# Patient Record
Sex: MALE | Race: WHITE | NOT HISPANIC OR LATINO | Employment: OTHER | URBAN - METROPOLITAN AREA
[De-identification: names, ages, dates, MRNs, and addresses within clinical notes are randomized per-mention and may not be internally consistent; named-entity substitution may affect disease eponyms.]

---

## 2017-05-07 ENCOUNTER — APPOINTMENT (EMERGENCY)
Dept: RADIOLOGY | Facility: HOSPITAL | Age: 77
End: 2017-05-07
Payer: MEDICARE

## 2017-05-07 ENCOUNTER — HOSPITAL ENCOUNTER (EMERGENCY)
Facility: HOSPITAL | Age: 77
Discharge: HOME | End: 2017-05-07
Attending: EMERGENCY MEDICINE | Admitting: EMERGENCY MEDICINE
Payer: MEDICARE

## 2017-05-07 ENCOUNTER — APPOINTMENT (EMERGENCY)
Dept: CT IMAGING | Facility: HOSPITAL | Age: 77
End: 2017-05-07
Payer: MEDICARE

## 2017-05-07 VITALS
OXYGEN SATURATION: 100 % | SYSTOLIC BLOOD PRESSURE: 196 MMHG | BODY MASS INDEX: 27.92 KG/M2 | DIASTOLIC BLOOD PRESSURE: 89 MMHG | TEMPERATURE: 97.9 F | RESPIRATION RATE: 18 BRPM | WEIGHT: 173 LBS | HEART RATE: 67 BPM

## 2017-05-07 DIAGNOSIS — R07.89 ATYPICAL CHEST PAIN: ICD-10-CM

## 2017-05-07 DIAGNOSIS — I10 HYPERTENSION: ICD-10-CM

## 2017-05-07 DIAGNOSIS — R74.8 ELEVATED LIPASE: ICD-10-CM

## 2017-05-07 DIAGNOSIS — R10.13 EPIGASTRIC PAIN: Primary | ICD-10-CM

## 2017-05-07 LAB
ALBUMIN SERPL BCP-MCNC: 3.7 G/DL (ref 3.5–5)
ALP SERPL-CCNC: 88 U/L (ref 46–116)
ALT SERPL W P-5'-P-CCNC: 33 U/L (ref 12–78)
ANION GAP SERPL CALCULATED.3IONS-SCNC: 9 MMOL/L (ref 4–13)
AST SERPL W P-5'-P-CCNC: 25 U/L (ref 5–45)
ATRIAL RATE: 74 BPM
BASOPHILS # BLD AUTO: 0.02 THOUSANDS/ΜL (ref 0–0.1)
BASOPHILS NFR BLD AUTO: 0 % (ref 0–1)
BILIRUB DIRECT SERPL-MCNC: 0.09 MG/DL (ref 0–0.2)
BILIRUB SERPL-MCNC: 0.35 MG/DL (ref 0.2–1)
BUN SERPL-MCNC: 25 MG/DL (ref 5–25)
CALCIUM SERPL-MCNC: 9.1 MG/DL (ref 8.3–10.1)
CHLORIDE SERPL-SCNC: 103 MMOL/L (ref 100–108)
CO2 SERPL-SCNC: 25 MMOL/L (ref 21–32)
CREAT SERPL-MCNC: 1.65 MG/DL (ref 0.6–1.3)
EOSINOPHIL # BLD AUTO: 0.13 THOUSAND/ΜL (ref 0–0.61)
EOSINOPHIL NFR BLD AUTO: 2 % (ref 0–6)
ERYTHROCYTE [DISTWIDTH] IN BLOOD BY AUTOMATED COUNT: 14.7 % (ref 11.6–15.1)
GFR SERPL CREATININE-BSD FRML MDRD: 40.9 ML/MIN/1.73SQ M
GLUCOSE SERPL-MCNC: 95 MG/DL (ref 65–140)
HCT VFR BLD AUTO: 44.1 % (ref 36.5–49.3)
HGB BLD-MCNC: 15.2 G/DL (ref 12–17)
LIPASE SERPL-CCNC: 449 U/L (ref 73–393)
LYMPHOCYTES # BLD AUTO: 2.16 THOUSANDS/ΜL (ref 0.6–4.47)
LYMPHOCYTES NFR BLD AUTO: 29 % (ref 14–44)
MCH RBC QN AUTO: 30.5 PG (ref 26.8–34.3)
MCHC RBC AUTO-ENTMCNC: 34.5 G/DL (ref 31.4–37.4)
MCV RBC AUTO: 89 FL (ref 82–98)
MONOCYTES # BLD AUTO: 0.56 THOUSAND/ΜL (ref 0.17–1.22)
MONOCYTES NFR BLD AUTO: 8 % (ref 4–12)
NEUTROPHILS # BLD AUTO: 4.52 THOUSANDS/ΜL (ref 1.85–7.62)
NEUTS SEG NFR BLD AUTO: 61 % (ref 43–75)
P AXIS: 58 DEGREES
PLATELET # BLD AUTO: 204 THOUSANDS/UL (ref 149–390)
PMV BLD AUTO: 10.3 FL (ref 8.9–12.7)
POTASSIUM SERPL-SCNC: 4.3 MMOL/L (ref 3.5–5.3)
PR INTERVAL: 164 MS
PROT SERPL-MCNC: 7.5 G/DL (ref 6.4–8.2)
QRS AXIS: 66 DEGREES
QRSD INTERVAL: 86 MS
QT INTERVAL: 362 MS
QTC INTERVAL: 401 MS
RBC # BLD AUTO: 4.98 MILLION/UL (ref 3.88–5.62)
SODIUM SERPL-SCNC: 137 MMOL/L (ref 136–145)
SPECIMEN SOURCE: NORMAL
SPECIMEN SOURCE: NORMAL
T WAVE AXIS: 41 DEGREES
TROPONIN I BLD-MCNC: 0 NG/ML (ref 0–0.08)
TROPONIN I BLD-MCNC: 0 NG/ML (ref 0–0.08)
VENTRICULAR RATE: 74 BPM
WBC # BLD AUTO: 7.39 THOUSAND/UL (ref 4.31–10.16)

## 2017-05-07 PROCEDURE — 84484 ASSAY OF TROPONIN QUANT: CPT

## 2017-05-07 PROCEDURE — 71020 HB CHEST X-RAY 2VW FRONTAL&LATL: CPT

## 2017-05-07 PROCEDURE — 80076 HEPATIC FUNCTION PANEL: CPT | Performed by: EMERGENCY MEDICINE

## 2017-05-07 PROCEDURE — 93005 ELECTROCARDIOGRAM TRACING: CPT | Performed by: EMERGENCY MEDICINE

## 2017-05-07 PROCEDURE — 96361 HYDRATE IV INFUSION ADD-ON: CPT

## 2017-05-07 PROCEDURE — 83690 ASSAY OF LIPASE: CPT | Performed by: EMERGENCY MEDICINE

## 2017-05-07 PROCEDURE — 36415 COLL VENOUS BLD VENIPUNCTURE: CPT | Performed by: EMERGENCY MEDICINE

## 2017-05-07 PROCEDURE — 96360 HYDRATION IV INFUSION INIT: CPT

## 2017-05-07 PROCEDURE — 80048 BASIC METABOLIC PNL TOTAL CA: CPT | Performed by: EMERGENCY MEDICINE

## 2017-05-07 PROCEDURE — 85025 COMPLETE CBC W/AUTO DIFF WBC: CPT | Performed by: EMERGENCY MEDICINE

## 2017-05-07 PROCEDURE — 74177 CT ABD & PELVIS W/CONTRAST: CPT

## 2017-05-07 PROCEDURE — 99285 EMERGENCY DEPT VISIT HI MDM: CPT

## 2017-05-07 RX ADMIN — SODIUM CHLORIDE 1000 ML: 0.9 INJECTION, SOLUTION INTRAVENOUS at 14:46

## 2017-05-07 RX ADMIN — IODIXANOL 85 ML: 320 INJECTION, SOLUTION INTRAVASCULAR at 15:04

## 2017-05-08 LAB
ATRIAL RATE: 70 BPM
P AXIS: 64 DEGREES
PR INTERVAL: 168 MS
QRS AXIS: 68 DEGREES
QRSD INTERVAL: 90 MS
QT INTERVAL: 404 MS
QTC INTERVAL: 436 MS
T WAVE AXIS: 40 DEGREES
VENTRICULAR RATE: 70 BPM

## 2017-12-17 ENCOUNTER — APPOINTMENT (EMERGENCY)
Dept: RADIOLOGY | Facility: HOSPITAL | Age: 77
End: 2017-12-17
Payer: MEDICARE

## 2017-12-17 ENCOUNTER — HOSPITAL ENCOUNTER (OUTPATIENT)
Facility: HOSPITAL | Age: 77
Setting detail: OBSERVATION
Discharge: HOME | End: 2017-12-17
Attending: EMERGENCY MEDICINE | Admitting: INTERNAL MEDICINE
Payer: MEDICARE

## 2017-12-17 VITALS
OXYGEN SATURATION: 99 % | WEIGHT: 170.86 LBS | DIASTOLIC BLOOD PRESSURE: 80 MMHG | TEMPERATURE: 97.8 F | HEART RATE: 79 BPM | BODY MASS INDEX: 28.47 KG/M2 | HEIGHT: 65 IN | SYSTOLIC BLOOD PRESSURE: 170 MMHG | RESPIRATION RATE: 17 BRPM

## 2017-12-17 DIAGNOSIS — R07.9 CHEST PAIN: Primary | ICD-10-CM

## 2017-12-17 DIAGNOSIS — N18.9 CKD (CHRONIC KIDNEY DISEASE): Chronic | ICD-10-CM

## 2017-12-17 DIAGNOSIS — M71.9 DISORDER OF BURSAE AND TENDONS IN SHOULDER REGION: ICD-10-CM

## 2017-12-17 DIAGNOSIS — R94.31 ABNORMAL EKG: ICD-10-CM

## 2017-12-17 DIAGNOSIS — M25.511 RIGHT SHOULDER PAIN: ICD-10-CM

## 2017-12-17 DIAGNOSIS — M67.919 DISORDER OF BURSAE AND TENDONS IN SHOULDER REGION: ICD-10-CM

## 2017-12-17 PROBLEM — E87.1 HYPONATREMIA: Status: ACTIVE | Noted: 2017-12-17

## 2017-12-17 PROBLEM — D72.829 LEUKOCYTOSIS: Status: ACTIVE | Noted: 2017-12-17

## 2017-12-17 LAB
ALBUMIN SERPL BCP-MCNC: 3.9 G/DL (ref 3.5–5)
ALP SERPL-CCNC: 101 U/L (ref 46–116)
ALT SERPL W P-5'-P-CCNC: 34 U/L (ref 12–78)
ANION GAP SERPL CALCULATED.3IONS-SCNC: 11 MMOL/L (ref 4–13)
APTT PPP: 28 SECONDS (ref 23–35)
AST SERPL W P-5'-P-CCNC: 34 U/L (ref 5–45)
ATRIAL RATE: 82 BPM
ATRIAL RATE: 84 BPM
BASOPHILS # BLD AUTO: 0.02 THOUSANDS/ΜL (ref 0–0.1)
BASOPHILS NFR BLD AUTO: 0 % (ref 0–1)
BILIRUB SERPL-MCNC: 0.56 MG/DL (ref 0.2–1)
BUN SERPL-MCNC: 27 MG/DL (ref 5–25)
CALCIUM SERPL-MCNC: 10.2 MG/DL (ref 8.3–10.1)
CHLORIDE SERPL-SCNC: 100 MMOL/L (ref 100–108)
CO2 SERPL-SCNC: 24 MMOL/L (ref 21–32)
CREAT SERPL-MCNC: 1.68 MG/DL (ref 0.6–1.3)
EOSINOPHIL # BLD AUTO: 0.12 THOUSAND/ΜL (ref 0–0.61)
EOSINOPHIL NFR BLD AUTO: 1 % (ref 0–6)
ERYTHROCYTE [DISTWIDTH] IN BLOOD BY AUTOMATED COUNT: 14.6 % (ref 11.6–15.1)
EST. AVERAGE GLUCOSE BLD GHB EST-MCNC: 140 MG/DL
GFR SERPL CREATININE-BSD FRML MDRD: 39 ML/MIN/1.73SQ M
GLUCOSE SERPL-MCNC: 140 MG/DL (ref 65–140)
HBA1C MFR BLD: 6.5 % (ref 4.2–6.3)
HCT VFR BLD AUTO: 46.3 % (ref 36.5–49.3)
HGB BLD-MCNC: 15.8 G/DL (ref 12–17)
INR PPP: 0.92 (ref 0.86–1.16)
LYMPHOCYTES # BLD AUTO: 3.35 THOUSANDS/ΜL (ref 0.6–4.47)
LYMPHOCYTES NFR BLD AUTO: 24 % (ref 14–44)
MAGNESIUM SERPL-MCNC: 1.7 MG/DL (ref 1.6–2.6)
MCH RBC QN AUTO: 30.4 PG (ref 26.8–34.3)
MCHC RBC AUTO-ENTMCNC: 34.1 G/DL (ref 31.4–37.4)
MCV RBC AUTO: 89 FL (ref 82–98)
MONOCYTES # BLD AUTO: 1.23 THOUSAND/ΜL (ref 0.17–1.22)
MONOCYTES NFR BLD AUTO: 9 % (ref 4–12)
NEUTROPHILS # BLD AUTO: 9.05 THOUSANDS/ΜL (ref 1.85–7.62)
NEUTS SEG NFR BLD AUTO: 66 % (ref 43–75)
P AXIS: 59 DEGREES
P AXIS: 79 DEGREES
PLATELET # BLD AUTO: 207 THOUSANDS/UL (ref 149–390)
PLATELET # BLD AUTO: 237 THOUSANDS/UL (ref 149–390)
PMV BLD AUTO: 10.1 FL (ref 8.9–12.7)
PMV BLD AUTO: 10.5 FL (ref 8.9–12.7)
POTASSIUM SERPL-SCNC: 5.2 MMOL/L (ref 3.5–5.3)
PR INTERVAL: 154 MS
PR INTERVAL: 158 MS
PROT SERPL-MCNC: 8.1 G/DL (ref 6.4–8.2)
PROTHROMBIN TIME: 12.4 SECONDS (ref 12.1–14.4)
QRS AXIS: 105 DEGREES
QRS AXIS: 37 DEGREES
QRSD INTERVAL: 84 MS
QRSD INTERVAL: 88 MS
QT INTERVAL: 340 MS
QT INTERVAL: 374 MS
QTC INTERVAL: 401 MS
QTC INTERVAL: 436 MS
RBC # BLD AUTO: 5.2 MILLION/UL (ref 3.88–5.62)
SODIUM SERPL-SCNC: 135 MMOL/L (ref 136–145)
SPECIMEN SOURCE: NORMAL
T WAVE AXIS: -21 DEGREES
T WAVE AXIS: 54 DEGREES
TROPONIN I BLD-MCNC: 0 NG/ML (ref 0–0.08)
TROPONIN I SERPL-MCNC: <0.02 NG/ML
TROPONIN I SERPL-MCNC: <0.02 NG/ML
VENTRICULAR RATE: 82 BPM
VENTRICULAR RATE: 84 BPM
WBC # BLD AUTO: 13.77 THOUSAND/UL (ref 4.31–10.16)

## 2017-12-17 PROCEDURE — 83735 ASSAY OF MAGNESIUM: CPT | Performed by: EMERGENCY MEDICINE

## 2017-12-17 PROCEDURE — 84484 ASSAY OF TROPONIN QUANT: CPT | Performed by: PHYSICIAN ASSISTANT

## 2017-12-17 PROCEDURE — 85730 THROMBOPLASTIN TIME PARTIAL: CPT | Performed by: EMERGENCY MEDICINE

## 2017-12-17 PROCEDURE — 93005 ELECTROCARDIOGRAM TRACING: CPT | Performed by: PHYSICIAN ASSISTANT

## 2017-12-17 PROCEDURE — 96375 TX/PRO/DX INJ NEW DRUG ADDON: CPT

## 2017-12-17 PROCEDURE — 85610 PROTHROMBIN TIME: CPT | Performed by: EMERGENCY MEDICINE

## 2017-12-17 PROCEDURE — 99285 EMERGENCY DEPT VISIT HI MDM: CPT

## 2017-12-17 PROCEDURE — 71020 HB CHEST X-RAY 2VW FRONTAL&LATL: CPT

## 2017-12-17 PROCEDURE — 96361 HYDRATE IV INFUSION ADD-ON: CPT

## 2017-12-17 PROCEDURE — 36415 COLL VENOUS BLD VENIPUNCTURE: CPT | Performed by: EMERGENCY MEDICINE

## 2017-12-17 PROCEDURE — 85049 AUTOMATED PLATELET COUNT: CPT | Performed by: PHYSICIAN ASSISTANT

## 2017-12-17 PROCEDURE — 96374 THER/PROPH/DIAG INJ IV PUSH: CPT

## 2017-12-17 PROCEDURE — 84484 ASSAY OF TROPONIN QUANT: CPT | Performed by: INTERNAL MEDICINE

## 2017-12-17 PROCEDURE — 93005 ELECTROCARDIOGRAM TRACING: CPT

## 2017-12-17 PROCEDURE — 85025 COMPLETE CBC W/AUTO DIFF WBC: CPT | Performed by: EMERGENCY MEDICINE

## 2017-12-17 PROCEDURE — 84484 ASSAY OF TROPONIN QUANT: CPT

## 2017-12-17 PROCEDURE — 80053 COMPREHEN METABOLIC PANEL: CPT | Performed by: EMERGENCY MEDICINE

## 2017-12-17 PROCEDURE — 83036 HEMOGLOBIN GLYCOSYLATED A1C: CPT | Performed by: PHYSICIAN ASSISTANT

## 2017-12-17 RX ORDER — ASPIRIN 325 MG
325 TABLET ORAL DAILY
Status: DISCONTINUED | OUTPATIENT
Start: 2017-12-18 | End: 2017-12-17 | Stop reason: HOSPADM

## 2017-12-17 RX ORDER — LISINOPRIL 2.5 MG/1
2.5 TABLET ORAL DAILY
COMMUNITY

## 2017-12-17 RX ORDER — ONDANSETRON 2 MG/ML
4 INJECTION INTRAMUSCULAR; INTRAVENOUS ONCE
Status: COMPLETED | OUTPATIENT
Start: 2017-12-17 | End: 2017-12-17

## 2017-12-17 RX ORDER — MORPHINE SULFATE 4 MG/ML
4 INJECTION, SOLUTION INTRAMUSCULAR; INTRAVENOUS ONCE
Status: COMPLETED | OUTPATIENT
Start: 2017-12-17 | End: 2017-12-17

## 2017-12-17 RX ORDER — NITROGLYCERIN 0.4 MG/1
0.4 TABLET SUBLINGUAL
Status: DISCONTINUED | OUTPATIENT
Start: 2017-12-17 | End: 2017-12-17 | Stop reason: HOSPADM

## 2017-12-17 RX ORDER — LIDOCAINE 50 MG/G
1 PATCH TOPICAL DAILY
Status: COMPLETED | OUTPATIENT
Start: 2017-12-17 | End: 2017-12-17

## 2017-12-17 RX ORDER — OXYCODONE HYDROCHLORIDE 5 MG/1
5 TABLET ORAL EVERY 4 HOURS PRN
Status: DISCONTINUED | OUTPATIENT
Start: 2017-12-17 | End: 2017-12-17

## 2017-12-17 RX ORDER — ACETAMINOPHEN 325 MG/1
975 TABLET ORAL EVERY 8 HOURS SCHEDULED
Status: DISCONTINUED | OUTPATIENT
Start: 2017-12-17 | End: 2017-12-17

## 2017-12-17 RX ORDER — OXYCODONE HYDROCHLORIDE AND ACETAMINOPHEN 5; 325 MG/1; MG/1
1 TABLET ORAL EVERY 4 HOURS PRN
Status: DISCONTINUED | OUTPATIENT
Start: 2017-12-17 | End: 2017-12-17 | Stop reason: HOSPADM

## 2017-12-17 RX ORDER — MORPHINE SULFATE 4 MG/ML
4 INJECTION, SOLUTION INTRAMUSCULAR; INTRAVENOUS EVERY 6 HOURS PRN
Status: DISCONTINUED | OUTPATIENT
Start: 2017-12-17 | End: 2017-12-17

## 2017-12-17 RX ORDER — HEPARIN SODIUM 5000 [USP'U]/ML
5000 INJECTION, SOLUTION INTRAVENOUS; SUBCUTANEOUS EVERY 8 HOURS SCHEDULED
Status: DISCONTINUED | OUTPATIENT
Start: 2017-12-17 | End: 2017-12-17 | Stop reason: HOSPADM

## 2017-12-17 RX ORDER — OXYCODONE HYDROCHLORIDE AND ACETAMINOPHEN 5; 325 MG/1; MG/1
1 TABLET ORAL EVERY 4 HOURS PRN
Qty: 10 TABLET | Refills: 0 | Status: SHIPPED | OUTPATIENT
Start: 2017-12-17 | End: 2017-12-27

## 2017-12-17 RX ORDER — CHOLECALCIFEROL (VITAMIN D3) 125 MCG
400 CAPSULE ORAL DAILY
Status: DISCONTINUED | OUTPATIENT
Start: 2017-12-17 | End: 2017-12-17 | Stop reason: HOSPADM

## 2017-12-17 RX ORDER — MORPHINE SULFATE 4 MG/ML
4 INJECTION, SOLUTION INTRAMUSCULAR; INTRAVENOUS ONCE AS NEEDED
Status: DISCONTINUED | OUTPATIENT
Start: 2017-12-17 | End: 2017-12-17

## 2017-12-17 RX ORDER — CHLORAL HYDRATE 500 MG
1000 CAPSULE ORAL DAILY
Status: DISCONTINUED | OUTPATIENT
Start: 2017-12-17 | End: 2017-12-17 | Stop reason: HOSPADM

## 2017-12-17 RX ORDER — LISINOPRIL 5 MG/1
5 TABLET ORAL DAILY
Status: DISCONTINUED | OUTPATIENT
Start: 2017-12-17 | End: 2017-12-17 | Stop reason: HOSPADM

## 2017-12-17 RX ORDER — ONDANSETRON 2 MG/ML
4 INJECTION INTRAMUSCULAR; INTRAVENOUS ONCE AS NEEDED
Status: DISCONTINUED | OUTPATIENT
Start: 2017-12-17 | End: 2017-12-17 | Stop reason: HOSPADM

## 2017-12-17 RX ORDER — CELECOXIB 200 MG/1
200 CAPSULE ORAL 2 TIMES DAILY
COMMUNITY
End: 2017-12-17 | Stop reason: HOSPADM

## 2017-12-17 RX ORDER — ONDANSETRON 2 MG/ML
4 INJECTION INTRAMUSCULAR; INTRAVENOUS EVERY 6 HOURS PRN
Status: DISCONTINUED | OUTPATIENT
Start: 2017-12-17 | End: 2017-12-17 | Stop reason: HOSPADM

## 2017-12-17 RX ORDER — ATORVASTATIN CALCIUM 80 MG/1
80 TABLET, FILM COATED ORAL
Status: DISCONTINUED | OUTPATIENT
Start: 2017-12-17 | End: 2017-12-17 | Stop reason: HOSPADM

## 2017-12-17 RX ORDER — METOPROLOL SUCCINATE 50 MG/1
50 TABLET, EXTENDED RELEASE ORAL DAILY
Status: DISCONTINUED | OUTPATIENT
Start: 2017-12-17 | End: 2017-12-17 | Stop reason: HOSPADM

## 2017-12-17 RX ADMIN — HEPARIN SODIUM 5000 UNITS: 5000 INJECTION, SOLUTION INTRAVENOUS; SUBCUTANEOUS at 14:23

## 2017-12-17 RX ADMIN — OXYCODONE HYDROCHLORIDE AND ACETAMINOPHEN 1 TABLET: 5; 325 TABLET ORAL at 13:21

## 2017-12-17 RX ADMIN — METOPROLOL SUCCINATE 50 MG: 50 TABLET, EXTENDED RELEASE ORAL at 08:35

## 2017-12-17 RX ADMIN — MORPHINE SULFATE 4 MG: 4 INJECTION INTRAVENOUS at 11:14

## 2017-12-17 RX ADMIN — HEPARIN SODIUM 5000 UNITS: 5000 INJECTION, SOLUTION INTRAVENOUS; SUBCUTANEOUS at 05:52

## 2017-12-17 RX ADMIN — OXYCODONE HYDROCHLORIDE 5 MG: 5 TABLET ORAL at 05:49

## 2017-12-17 RX ADMIN — Medication 400 MG: at 08:35

## 2017-12-17 RX ADMIN — ATORVASTATIN CALCIUM 80 MG: 80 TABLET, FILM COATED ORAL at 16:43

## 2017-12-17 RX ADMIN — SODIUM CHLORIDE 1000 ML: 0.9 INJECTION, SOLUTION INTRAVENOUS at 03:27

## 2017-12-17 RX ADMIN — Medication 1000 MG: at 08:35

## 2017-12-17 RX ADMIN — LISINOPRIL 5 MG: 5 TABLET ORAL at 08:35

## 2017-12-17 RX ADMIN — MORPHINE SULFATE 4 MG: 4 INJECTION, SOLUTION INTRAMUSCULAR; INTRAVENOUS at 03:29

## 2017-12-17 RX ADMIN — LIDOCAINE 1 PATCH: 50 PATCH CUTANEOUS at 08:35

## 2017-12-17 RX ADMIN — ONDANSETRON 4 MG: 2 INJECTION INTRAMUSCULAR; INTRAVENOUS at 03:26

## 2017-12-17 NOTE — Clinical Note
Case was discussed with PATRICK and the patient's admission status was agreed to be Admission Status: observation status to the service of Dr Jigar Macias

## 2017-12-17 NOTE — ED PROVIDER NOTES
History  Chief Complaint   Patient presents with    Shoulder Pain     pt arrives with EMS, has a right torn rotator cuff follows with [facility], took tramadol without relief, became concerned because pain started radiating to chest, hx of MI     Patient is a 81-year-old male that presents for chest pain  He states that he called [facility] for right shoulder pain  He has a history of a right rotator cuff tear that is beyond repair  He has had a fusions in the past and feels that he has 1 now  He is unable to move his arm which is causing his pain  He states that the pain has been worsening over the past few days and has another orthopedic appointment coming up in 2 days  States that he was unable to take the pain tonight which prompted the call but the patient also has had chest pain throughout the day today with more pain tonight  Patient does have a history of coronary artery disease and has had a CABG done in 2009  History provided by:  Patient and spouse   used: No    Chest Pain   Pain location:  Unable to specify  Pain quality: pressure    Pain radiates to the back: no    Pain severity:  Moderate  Onset quality:  Gradual  Duration:  1 day  Timing:  Intermittent  Progression:  Worsening  Chronicity:  Recurrent  Context: at rest    Relieved by:  None tried  Worsened by:  Nothing tried  Ineffective treatments:  None tried  Associated symptoms: nausea and vomiting    Associated symptoms: no cough, no fever and no shortness of breath    Risk factors: coronary artery disease, high cholesterol, hypertension and male sex        Prior to Admission Medications   Prescriptions Last Dose Informant Patient Reported? Taking? Multiple Vitamin (MULTI VITAMIN DAILY PO)   Yes Yes   Sig: Take by mouth   Omega-3 Fatty Acids (FISH OIL PO)   Yes Yes   Sig: Take 1,200 mg by mouth daily  aspirin 325 mg tablet   Yes Yes   Sig: Take 325 mg by mouth daily     atorvastatin (LIPITOR) 80 mg tablet   Yes Yes   Sig: Take 80 mg by mouth daily  celecoxib (CeleBREX) 200 mg capsule   Yes Yes   Sig: Take 200 mg by mouth 2 (two) times a day   co-enzyme Q-10 30 MG capsule   Yes Yes   Sig: Take 400 mg by mouth daily  lisinopril (ZESTRIL) 2 5 mg tablet   Yes Yes   Sig: Take 2 5 mg by mouth daily   metoprolol succinate (TOPROL-XL) 50 mg 24 hr tablet   Yes Yes   Sig: Take 50 mg by mouth daily  Facility-Administered Medications: None       Past Medical History:   Diagnosis Date    Benign hypertension 3/9/2015    Cardiac tamponade     Carotid stenosis 3/24/2016    CKD (chronic kidney disease) 3/24/2016    Coronary arteriosclerosis in native artery 12/19/2003    Gastroesophageal reflux disease 1/28/2014    Hyperlipidemia     Hypertension     Myocardial infarction        Past Surgical History:   Procedure Laterality Date    CARDIAC SURGERY      CAROTID ARTERY ANGIOPLASTY         History reviewed  No pertinent family history  I have reviewed and agree with the history as documented  Social History   Substance Use Topics    Smoking status: Never Smoker    Smokeless tobacco: Not on file    Alcohol use No        Review of Systems   Constitutional: Negative for chills and fever  Respiratory: Negative for cough and shortness of breath  Cardiovascular: Positive for chest pain  Negative for leg swelling  Gastrointestinal: Positive for nausea and vomiting  Musculoskeletal: Positive for arthralgias and joint swelling  Skin: Negative for color change, pallor, rash and wound  Allergic/Immunologic: Negative for immunocompromised state  All other systems reviewed and are negative        Physical Exam  ED Triage Vitals [12/17/17 0252]   Temperature Pulse Respirations Blood Pressure SpO2   98 2 °F (36 8 °C) 82 20 (!) 199/89 94 %      Temp Source Heart Rate Source Patient Position - Orthostatic VS BP Location FiO2 (%)   Oral Monitor -- Left arm --      Pain Score       Worst Possible Pain Orthostatic Vital Signs  Vitals:    12/17/17 0252 12/17/17 0359 12/17/17 0425   BP: (!) 199/89 (!) 198/86 166/74   Pulse: 82 92 86       Physical Exam   Constitutional: He is oriented to person, place, and time  He appears well-developed and well-nourished  HENT:   Head: Normocephalic and atraumatic  Eyes: Conjunctivae are normal  No scleral icterus  Neck: Normal range of motion  Neck supple  Cardiovascular: Normal rate, regular rhythm, normal heart sounds and intact distal pulses  Exam reveals no friction rub  No murmur heard  Pulmonary/Chest: Effort normal and breath sounds normal  No respiratory distress  He has no wheezes  He has no rales  Abdominal: Soft  He exhibits no distension  There is no tenderness  There is no rebound and no guarding  Musculoskeletal: He exhibits tenderness  He exhibits no deformity  Right shoulder: He exhibits decreased range of motion, effusion and pain  Neurological: He is alert and oriented to person, place, and time  Skin: Skin is warm and dry  Psychiatric: He has a normal mood and affect  Nursing note and vitals reviewed        ED Medications  Medications   sodium chloride 0 9 % bolus 1,000 mL (1,000 mL Intravenous New Bag 12/17/17 0327)   ondansetron (ZOFRAN) injection 4 mg (4 mg Intravenous Given 12/17/17 0326)   morphine (PF) 4 mg/mL injection 4 mg (4 mg Intravenous Given 12/17/17 0329)       Diagnostic Studies  Results Reviewed     Procedure Component Value Units Date/Time    POCT troponin [[id number]]  (Normal) Collected:  12/17/17 0329    Lab Status:  Final result Updated:  12/17/17 0355     POC Troponin I 0 00 ng/ml      Specimen Type VENOUS    Narrative:         [employer] i-Stat handheld analyzer 99% cutoff is > 0 08ng/mL in network Emergency Departments    o cTnI 99% cutoff is useful only when applied to patients in the clinical setting of myocardial ischemia  o cTnI 99% cutoff should be interpreted in the context of clinical history, ECG findings and possibly cardiac imaging to establish correct diagnosis  o cTnI 99% cutoff may be suggestive but clearly not indicative of a coronary event without the clinical setting of myocardial ischemia  Comprehensive metabolic panel [[id number]]  (Abnormal) Collected:  12/17/17 0326    Lab Status:  Final result Specimen:  Blood from Arm, Left Updated:  12/17/17 0345     Sodium 135 (L) mmol/L      Potassium 5 2 mmol/L      Chloride 100 mmol/L      CO2 24 mmol/L      Anion Gap 11 mmol/L      BUN 27 (H) mg/dL      Creatinine 1 68 (H) mg/dL      Glucose 140 mg/dL      Calcium 10 2 (H) mg/dL      AST 34 U/L      ALT 34 U/L      Alkaline Phosphatase 101 U/L      Total Protein 8 1 g/dL      Albumin 3 9 g/dL      Total Bilirubin 0 56 mg/dL      eGFR 39 ml/min/1 73sq m     Narrative:         National Kidney Disease Education Program recommendations are as follows:  GFR calculation is accurate only with a steady state creatinine  Chronic Kidney disease less than 60 ml/min/1 73 sq  meters  Kidney failure less than 15 ml/min/1 73 sq  meters  Magnesium [[id number]]  (Normal) Collected:  12/17/17 0326    Lab Status:  Final result Specimen:  Blood from Arm, Left Updated:  12/17/17 0345     Magnesium 1 7 mg/dL     Protime-INR [[id number]]  (Normal) Collected:  12/17/17 0326    Lab Status:  Final result Specimen:  Blood from Arm, Left Updated:  12/17/17 0344     Protime 12 4 seconds      INR 0 92    APTT [[id number]]  (Normal) Collected:  12/17/17 0326    Lab Status:  Final result Specimen:  Blood from Arm, Left Updated:  12/17/17 0344     PTT 28 seconds     Narrative:          Therapeutic Heparin Range = 60-90 seconds    CBC and differential [[id number]]  (Abnormal) Collected:  12/17/17 0326    Lab Status:  Final result Specimen:  Blood from Arm, Left Updated:  12/17/17 0334     WBC 13 77 (H) Thousand/uL      RBC 5 20 Million/uL      Hemoglobin 15 8 g/dL      Hematocrit 46 3 %      MCV 89 fL      MCH 30 4 pg      MCHC 34 1 g/dL RDW 14 6 %      MPV 10 1 fL      Platelets 469 Thousands/uL      Neutrophils Relative 66 %      Lymphocytes Relative 24 %      Monocytes Relative 9 %      Eosinophils Relative 1 %      Basophils Relative 0 %      Neutrophils Absolute 9 05 (H) Thousands/µL      Lymphocytes Absolute 3 35 Thousands/µL      Monocytes Absolute 1 23 (H) Thousand/µL      Eosinophils Absolute 0 12 Thousand/µL      Basophils Absolute 0 02 Thousands/µL                  XR chest 2 views   ED Interpretation by Umu Trevino DO (12/17 8586)   NAD                 Procedures  ECG 12 Lead Documentation  Date/Time: 12/17/2017 3:01 AM  Performed by: Mark Browning  Authorized by: Mark Browning     Indications / Diagnosis:  Chest pain  Patient location:  ED  Previous ECG:     Previous ECG:  Compared to current    Similarity:  Changes noted  Interpretation:     Interpretation: abnormal    Rate:     ECG rate:  84    ECG rate assessment: normal    Rhythm:     Rhythm: sinus rhythm    Ectopy:     Ectopy: none    QRS:     QRS intervals:  Normal  Conduction:     Conduction: normal    ST segments:     ST segments:  Abnormal    Depression:  V5 and V6  T waves:     T waves: inverted      Inverted:  V6, V5 and aVF           Phone Contacts  ED Phone Contact    ED Course  ED Course                                MDM  Number of Diagnoses or Management Options  Abnormal EKG: new and requires workup  Chest pain: new and requires workup  CKD (chronic kidney disease): new and requires workup  Disorder of bursae and tendons in shoulder region: new and requires workup  Right shoulder pain: new and requires workup  Diagnosis management comments: Patient presented for shoulder pain and chest pain tonight  His shoulder pain is chronic and is worsening because of an effusion he has  In regards to the patient's chest pain because of his history of CABG in CAD a cardiac workup was done  His EKG is abnormal unchanged from prior    He now has inversions of his T-waves and depressions of his ST segments  His blood pressure is elevated but this came down once his pain was better controlled  Troponin is negative  Chest x-ray is limited because of his pain but appears grossly negative  Will admit for further observation, evaluation and treatment  Amount and/or Complexity of Data Reviewed  Clinical lab tests: ordered and reviewed  Tests in the radiology section of CPT®: ordered and reviewed  Tests in the medicine section of CPT®: reviewed and ordered  Review and summarize past medical records: yes  Independent visualization of images, tracings, or specimens: yes    Patient Progress  Patient progress: stable    CritCare Time    Disposition  Final diagnoses:   Chest pain   Abnormal EKG   CKD (chronic kidney disease)   Disorder of bursae and tendons in shoulder region   Right shoulder pain     Time reflects when diagnosis was documented in both MDM as applicable and the Disposition within this note     Time User Action Codes Description Comment    12/17/2017  4:06 AM Charyl Leap Add [R07 9] Chest pain     12/17/2017  4:06 AM SmeriglioJayesh Dukes Add [R94 31] Abnormal EKG     12/17/2017  4:06 AM SmeriglioJayesh Dukes Add [N18 9] CKD (chronic kidney disease)     12/17/2017  4:07 AM SmeriglioJayesh Dukes Add [M71 9,  M67 919] Disorder of bursae and tendons in shoulder region     12/17/2017  4:07 AM SmeriglioJayesh Dukes Add [M25 511] Right shoulder pain       ED Disposition     ED Disposition Condition Comment    Admit  Case was discussed with PATRICK and the patient's admission status was agreed to be Admission Status: observation status to the service of Dr Kristina Jimenez  Follow-up Information    None       Patient's Medications   Discharge Prescriptions    No medications on file     No discharge procedures on file      ED Provider  Electronically Signed by           Kamala Munroe DO  12/17/17 [id number]

## 2017-12-17 NOTE — ASSESSMENT & PLAN NOTE
Atypical CP (reproducible to palpation) Suspect secondary to shoulder pain, however given risk factors will admit to observation for ACS rule out  CXR negative for dissection, EKG demonstrates T-wave inversions in leads 3 and AVF with concomitant ST depressions and T-wave inversions in V5 and V6 which are new compared to previous approximately 5 months ago, trop 0  Patient took  mg today prior to arrival  Will risk stratify by lipid panel, hemoglobin A1c, start telemetry, trend troponins and check EKGs  Would recommend close f/u with Dr Lauren Lema for preop clearance upon d/c

## 2017-12-17 NOTE — CASE MANAGEMENT
Initial Clinical Review    Admission: Date/Time/Statement:   OBS  ORDER   12/17  @    0413     Orders Placed This Encounter   Procedures    Place in Observation (expected length of stay for this patient is less than two midnights)     Standing Status:   Standing     Number of Occurrences:   1     Order Specific Question:   Admitting Physician     Answer:   Rock Gan [[id number]]     Order Specific Question:   Level of Care     Answer:   Med Surg [[id number]]         ED: Date/Time/Mode of Arrival:   ED Arrival Information     Expected Arrival Acuity Means of Arrival Escorted By Service Admission Type    - 12/17/2017 02:46 Urgent Ambulance [facility] Urgent    Arrival Complaint    chest pain          Chief Complaint:   Chief Complaint   Patient presents with    Shoulder Pain     pt arrives with EMS, has a right torn rotator cuff follows with [facility], took tramadol without relief, became concerned because pain started radiating to chest, hx of MI       History of Illness: Mary West is a 69 y o  male who presents with history of intermittent shoulder pain, CAD status post CABG in 2009, hypertension coming in the ED for acute onset chest pain today  The patient reports that his pain is primarily in his right shoulder which is being evaluated by [facility] for possible total shoulder arthroplasty  Unfortunately, the patient reports his pain is in his right shoulder his difficulty with any range of motion whatsoever  He also notes that occasionally his pain radiating from his right shoulder blade to the mid and anterior chest wall reported this started about 4 hours prior to arrival he describes the pain as a sharp pain in his right shoulder that radiates into the mid substernal chest   He reports the pain is severe and takes his breath  Aw ay   The pain in the chest comes and goes,   He reports that the pain made him so nauseous that he vomited earlier today    He has no shortness breath, or cough or URI s/sx  He was seen and had NM stress done in  2016  He also had his cabg done in 2009    ED Vital Signs:   ED Triage Vitals   Temperature Pulse Respirations Blood Pressure SpO2   12/17/17 0252 12/17/17 0252 12/17/17 0252 12/17/17 0252 12/17/17 0252   98 2 °F (36 8 °C) 82 20 (!) 199/89 94 %      Temp Source Heart Rate Source Patient Position - Orthostatic VS BP Location FiO2 (%)   12/17/17 0252 12/17/17 0252 12/17/17 0500 12/17/17 0252 --   Oral Monitor Lying Left arm       Pain Score       12/17/17 0252       Worst Possible Pain        Wt Readings from Last 1 Encounters:   12/17/17 77 5 kg (170 lb 13 7 oz)       Vital Signs (abnormal):     above    Abnormal Labs/Diagnostic Test Results:   NA   135  BUN/Creat   27/1 68  WBC   13 77  CXR:  NAD    ED Treatment:   Medication Administration from 12/17/2017 0246 to 12/17/2017 0501       Date/Time Order Dose Route Action Action by Comments     12/17/2017 0326 ondansetron (ZOFRAN) injection 4 mg 4 mg Intravenous Given Juan Carlos Benton RN      12/17/2017 0329 morphine (PF) 4 mg/mL injection 4 mg 4 mg Intravenous Given Juan Carlos Benton RN      12/17/2017 0327 sodium chloride 0 9 % bolus 1,000 mL 1,000 mL Intravenous New Bag Juan Carlos Benton RN           Past Medical/Surgical History:    Active Ambulatory Problems     Diagnosis Date Noted    Disorder of right rotator cuff 02/12/2016    Coronary arteriosclerosis in native artery 12/19/2003    Degeneration of intervertebral disc of lumbosacral region 10/12/2012    Gastroesophageal reflux disease 01/28/2014    Benign hypertension 03/09/2015    Auditory vertigo 10/24/2013    Osteoarthritis 10/06/2014    Spinal stenosis of lumbar region 11/23/2005    Disorder of bursae and tendons in shoulder region 01/12/2016    CKD (chronic kidney disease) 03/24/2016    Carotid stenosis 03/24/2016     Resolved Ambulatory Problems     Diagnosis Date Noted    STACEY (acute kidney injury) ([facility] [address] ) 03/24/2016    Tachycardia 03/24/2016    Headache 03/24/2016    S/P CABG (coronary artery bypass graft) 03/24/2016     Past Medical History:   Diagnosis Date    Benign hypertension 3/9/2015    Cardiac tamponade     Carotid stenosis 3/24/2016    CKD (chronic kidney disease) 3/24/2016    Coronary arteriosclerosis in native artery 12/19/2003    Gastroesophageal reflux disease 1/28/2014    Hyperlipidemia     Hypertension     Myocardial infarction        Admitting Diagnosis: Chest pain [R07 9]  Disorder of bursae and tendons in shoulder region [M71 9, M67 919]  Abnormal EKG [R94 31]  CKD (chronic kidney disease) [N18 9]  Right shoulder pain [M25 511]    Age/Sex: 69 y o  male    Assessment/Plan:     Chest pain   Assessment & Plan     Suspect secondary to shoulder pain, however given risk factors will admit to observation for ACS rule out  CXR negative for dissection, EKG demonstrates T-wave inversions in leads 3 and AVF with concomitant ST depressions and T-wave inversions in V5 and V6 which are new compared to previous approximately 5 months ago, trop 0  Patient took  mg today prior to arrival  Will risk stratify by lipid panel, hemoglobin A1c, start telemetry, trend troponins and check EKGs  Would recommend close f/u with Dr Anuja Roman for preop clearance upon d/c       Coronary arteriosclerosis in native artery   Assessment & Plan     Last nuclear medicine stress test negative 1 year prior at [facility]  Status post bypass in 2009  Continue beta-blocker, continue full aspirin as noted above, continue statin          Disorder of right rotator cuff   Assessment & Plan     The patient was for a TSA through [address]) w/ f/u appointment tomorrow  Continue analgesic control  F/u with ortho upon d/c     Hyponatremia   Assessment & Plan     Mild, in the setting of CKD stage 3  Encourage free water intake will repeat BMP in a m        Leukocytosis   Assessment & Plan     Likely physiologic, will monitor carefully off of antibiotics       CKD (chronic kidney disease)   Assessment & Plan     Appears to be at baseline, continue avoid nephrotoxins, continue to avoid relative hypotension       Benign hypertension   Assessment & Plan     Accelerated secondary to pain  Continue lisinopril 2 5 mg increased to 5 mg daily here, continue metoprolol succinate 50 mg daily      Anticipated Length of Stay:  Patient will be admitted on an Observation basis with an anticipated length of stay of  Less than 2 midnights     Justification for Hospital Stay: ACS  Rule out      Admission Orders:   OBS  ORDER     12/17  @    0413  Scheduled Meds:   acetaminophen 975 mg Oral Q8H [address]   [START ON 12/18/2017] aspirin 325 mg Oral Daily   atorvastatin 80 mg Oral Daily With Dinner   co-enzyme Q-10 400 mg Oral Daily   fish oil 1,000 mg Oral Daily   heparin (porcine) 5,000 Units Subcutaneous Q8H [address]   lidocaine 1 patch Transdermal Daily   lisinopril 5 mg Oral Daily   metoprolol succinate 50 mg Oral Daily     Continuous Infusions:    PRN Meds: morphine injection    morphine injection    nitroglycerin    ondansetron    ondansetron    oxyCODONE       Tele  Reg diet  Serial troponin

## 2017-12-17 NOTE — ASSESSMENT & PLAN NOTE
Suspect secondary to shoulder pain, however given risk factors will admit to observation for ACS rule out  CXR negative for dissection, EKG demonstrates T-wave inversions in leads 3 and AVF with concomitant ST depressions and T-wave inversions in V5 and V6 which are new compared to previous approximately 5 months ago, trop 0  Patient took  mg today prior to arrival  Will risk stratify by lipid panel, hemoglobin A1c, start telemetry, trend troponins and check EKGs  Would recommend close f/u with Dr Basil Wheat for preop clearance upon d/c

## 2017-12-17 NOTE — DISCHARGE SUMMARY
Discharge Summary - [address] [facility]    Patient Information: Scottie Felty 69 y o  male MRN: [id number]  Unit/Bed#: E4 -01 Encounter: [id number]    Discharging Physician / Practitioner: Rome Estrella MD  PCP: Osito Sandy  Admission Date: 12/17/2017  Discharge Date: 12/17/17    Reason for Admission:  Chest pain    Discharge Diagnoses:     Principal Problem:    Right shoulder pain radiating to the chest  Active Problems:    Disorder of right rotator cuff    Coronary arteriosclerosis in native artery    Benign hypertension    CKD (chronic kidney disease)    Leukocytosis      Consultations During Hospital Stay:  · None    Procedures Performed:     · None    Significant Findings:     · Troponin x3 negative  · Right shoulder pain and limited range of motion due to rotator cuff tear    Incidental Findings:   · None     Test Results Pending at Discharge (will require follow up): · None     Outpatient Tests Requested:  · None    Complications:  None    Hospital Course:     Scottie Felty is a 69 y o  male patient who originally presented to the hospital on 12/17/2017 due to right shoulder pain radiating to the chest   He has known history of rotator cuff tear with ongoing right shoulder pain and limited range of motion  For the past few days the pain has been worsening  The pain was extending to his left chest does he was concerned about a possible heart attack and proceeded to the emergency department  He was observed overnight due to his chest pain and history of coronary artery disease  His chest pain was clearly from the right shoulder  He had limited range of motion and pain on passive motion of the right shoulder  He was placed on Percocet 5 mg every 4 hours as needed  He has a follow-up tomorrow with his orthopedic surgeon Dr Gamez [place] at 9:15 a m  for further treatment including possible intervention  The patient will be discharged on Percocet, total of 10 pills was given    He was informed about the possible side effects such as constipation, drowsiness and addiction  He was advised to use over-the-counter laxative as needed if constipation occurs and to avoid driving or operating machinery  He was told to use this only as needed due to the addiction potential   This was also discussed with his wife  Condition at Discharge: good     Discharge Day Visit / Exam:     * Please refer to separate progress for these details *    Discharge instructions/Information to patient and family:   See after visit summary for information provided to patient and family  Provisions for Follow-Up Care:  See after visit summary for information related to follow-up care and any pertinent home health orders  Disposition:     Home    For Discharges to [facility] SNF:   · Not Applicable to this Patient - Not Applicable to this Patient    Planned Readmission: none    Discharge Statement:  I spent 30 minutes discharging the patient  This time was spent on the day of discharge  I had direct contact with the patient on the day of discharge  Greater than 50% of the total time was spent examining patient, answering all patient questions, arranging and discussing plan of care with patient as well as directly providing post-discharge instructions  Additional time then spent on discharge activities  Discharge Medications:  See after visit summary for reconciled discharge medications provided to patient and family  ** Please Note: Dragon 360 Dictation voice to text software may have been used in the creation of this document   **

## 2017-12-17 NOTE — PLAN OF CARE
Problem: Potential for Falls  Goal: Patient will remain free of falls  INTERVENTIONS:  - Assess patient frequently for physical needs  -  Identify cognitive and physical deficits and behaviors that affect risk of falls    -  [place] fall precautions as indicated by assessment   - Educate patient/family on patient safety including physical limitations  - Instruct patient to call for assistance with activity based on assessment  - Modify environment to reduce risk of injury  - Consider OT/PT consult to assist with strengthening/mobility   Outcome: Progressing

## 2017-12-17 NOTE — DISCHARGE INSTRUCTIONS
TAKE PERCOCET ONLY AS NEEDED  THIS CAN CAUSE CONSTIPATION  YOU CAN USE OVER-THE-COUNTER COLACE OR SENNA IF CONSTIPATION DEVELOPS  THIS CAN CAUSE EXCESSIVE DROWSINESS SO AVOID DRIVING AND OPERATING MACHINERY  THIS CAN CAUSE ADDICTION, SO USE ONLY AS NEEDED  STOP CELEBREX SINCE THIS CAN CAUSE WORSENING KIDNEY DISEASE    FOLLOW-UP WITH DR Julio Farris

## 2017-12-17 NOTE — H&P
H&P- Holly Nicolas 1940, 69 y o  male MRN: [id number]    Unit/Bed#: E4 -01 Encounter: [id number]    Primary Care Provider: PADMINI Roach   Date and time admitted to hospital: 12/17/2017  2:46 AM            History and Physical - [facility]    Patient Information: Holly Nicolas 69 y o  male MRN: [id number]  Unit/Bed#: E4 -01 Encounter: [id number]  Admitting Physician: Young Rodriguez PA-C  PCP: PADMINI Roach  Date of Admission:  12/17/17    Assessment/Plan:    Hospital Problem List:     Principal Problem:    Chest pain  Active Problems:    Coronary arteriosclerosis in native artery    Disorder of right rotator cuff    Hyponatremia    Leukocytosis    CKD (chronic kidney disease)    Benign hypertension    * Chest pain   Assessment & Plan    Suspect secondary to shoulder pain, however given risk factors will admit to observation for ACS rule out  CXR negative for dissection, EKG demonstrates T-wave inversions in leads 3 and AVF with concomitant ST depressions and T-wave inversions in V5 and V6 which are new compared to previous approximately 5 months ago, trop 0  Patient took  mg today prior to arrival  Will risk stratify by lipid panel, hemoglobin A1c, start telemetry, trend troponins and check EKGs  Would recommend close f/u with Dr Migel Coates for preop clearance upon d/c        Coronary arteriosclerosis in native artery   Assessment & Plan    Last nuclear medicine stress test negative 1 year prior at [facility]  Status post bypass in 2009  Continue beta-blocker, continue full aspirin as noted above, continue statin          Disorder of right rotator cuff   Assessment & Plan    The patient was for a TSA through [address]) w/ f/u appointment tomorrow  Continue analgesic control  F/u with ortho upon d/c        Hyponatremia   Assessment & Plan    Mild, in the setting of CKD stage 3  Encourage free water intake will repeat BMP in a m  Leukocytosis   Assessment & Plan    Likely physiologic, will monitor carefully off of antibiotics        CKD (chronic kidney disease)   Assessment & Plan    Appears to be at baseline, continue avoid nephrotoxins, continue to avoid relative hypotension        Benign hypertension   Assessment & Plan    Accelerated secondary to pain  Continue lisinopril 2 5 mg increased to 5 mg daily here, continue metoprolol succinate 50 mg daily                VTE Prophylaxis: Heparin  / sequential compression device   Code Status: Full Code  POLST: There is no POLST form on file for this patient (pre-hospital)    Anticipated Length of Stay:  Patient will be admitted on an Observation basis with an anticipated length of stay of  Less than 2 midnights  Justification for Hospital Stay: ACS  Rule out    Total Time for Visit, including Counseling / Coordination of Care: 45 minutes  Greater than 50% of this total time spent on direct patient counseling and coordination of care  Chief Complaint:   Chest pain and shoulder pain  History of Present Illness:    Shivani Hyde is a 69 y o  male who presents with history of intermittent shoulder pain, CAD status post CABG in 2009, hypertension coming in the ED for acute onset chest pain today  The patient reports that his pain is primarily in his right shoulder which is being evaluated by [facility] for possible total shoulder arthroplasty  Unfortunately, the patient reports his pain is in his right shoulder his difficulty with any range of motion whatsoever  He also notes that occasionally his pain radiating from his right shoulder blade to the mid and anterior chest wall reported this started about 4 hours prior to arrival he describes the pain as a sharp pain in his right shoulder that radiates into the mid substernal chest   He reports the pain is severe and takes his breath  Aw ay    The pain in the chest comes and goes,   He reports that the pain made him so nauseous that he vomited earlier today  He has no shortness breath, or cough or URI s/sx  He was seen and had NM stress done in  2016  He also had his cabg done in 2009  Nate Chen Review of Systems:    Review of Systems   Constitutional: Negative for appetite change, chills and fever  HENT: Negative for congestion, rhinorrhea and sore throat  Respiratory: Negative for cough and shortness of breath  Cardiovascular: Positive for chest pain  Negative for palpitations and leg swelling  Gastrointestinal: Positive for nausea and vomiting  Negative for abdominal pain  Musculoskeletal: Positive for arthralgias  All other systems reviewed and are negative  Past Medical and Surgical History:     Past Medical History:   Diagnosis Date    Benign hypertension 3/9/2015    Cardiac tamponade     Carotid stenosis 3/24/2016    CKD (chronic kidney disease) 3/24/2016    Coronary arteriosclerosis in native artery 12/19/2003    Gastroesophageal reflux disease 1/28/2014    Hyperlipidemia     Hypertension     Myocardial infarction        Past Surgical History:   Procedure Laterality Date    CARDIAC SURGERY      CAROTID ARTERY ANGIOPLASTY         Meds/Allergies:    Prior to Admission medications    Medication Sig Start Date End Date Taking? Authorizing Provider   aspirin 325 mg tablet Take 325 mg by mouth daily  Yes Historical Provider, MD   atorvastatin (LIPITOR) 80 mg tablet Take 80 mg by mouth daily  Yes Historical Provider, MD   celecoxib (CeleBREX) 200 mg capsule Take 200 mg by mouth 2 (two) times a day   Yes Historical Provider, MD   co-enzyme Q-10 30 MG capsule Take 400 mg by mouth daily  Yes Historical Provider, MD   lisinopril (ZESTRIL) 2 5 mg tablet Take 2 5 mg by mouth daily   Yes Historical Provider, MD   metoprolol succinate (TOPROL-XL) 50 mg 24 hr tablet Take 50 mg by mouth daily     Yes Historical Provider, MD   Multiple Vitamin (MULTI VITAMIN DAILY PO) Take by mouth   Yes Historical Provider, MD Omega-3 Fatty Acids (FISH OIL PO) Take 1,200 mg by mouth daily  Yes Historical Provider, MD   amLODIPine (NORVASC) 2 5 mg tablet Take 2 5 mg by mouth daily  12/17/17  Historical Provider, MD     I have reviewed home medications with patient personally  Allergies: Allergies   Allergen Reactions    Demerol [Meperidine] GI Intolerance and Other (See Comments)     Other reaction(s): Vomiting  vomitting       Social History:     Marital Status: /Civil Union   Occupation:   Patient Pre-hospital Living Situation:   Patient Pre-hospital Level of Mobility:   Patient Pre-hospital Diet Restrictions:   Substance Use History:   History   Alcohol Use No     History   Smoking Status    Never Smoker   Smokeless Tobacco    Not on file     History   Drug Use No       Family History:  History reviewed  No pertinent family history  Physical Exam:     Vitals:   Blood Pressure: 170/75 (12/17/17 0500)  Pulse: 88 (12/17/17 0500)  Temperature: 98 6 °F (37 °C) (12/17/17 0500)  Temp Source: Temporal (12/17/17 0500)  Respirations: 16 (12/17/17 0500)  Height: 5' 5" (165 1 cm) (12/17/17 0500)  Weight - Scale: 77 5 kg (170 lb 13 7 oz) (12/17/17 0500)  SpO2: 95 % (12/17/17 0500)    Physical Exam   Constitutional: He appears well-developed  No distress  Appears stated age, no apparent distress   HENT:   Head: Normocephalic and atraumatic  Right Ear: External ear normal    Left Ear: External ear normal    Mucous membranes are somewhat moist   Eyes: Conjunctivae are normal  Right eye exhibits no discharge  Left eye exhibits no discharge  No scleral icterus  Neck: Normal range of motion  Cardiovascular: Normal rate, regular rhythm, normal heart sounds and intact distal pulses  Exam reveals no gallop and no friction rub  No murmur heard  Pulmonary/Chest: Effort normal  No respiratory distress  He has no wheezes  He has no rales  He exhibits tenderness  Abdominal: Soft  He exhibits no distension   There is no tenderness  There is no rebound and no guarding  Musculoskeletal: He exhibits no edema  Neurological: He is alert  Skin: Skin is warm and dry  He is not diaphoretic  Psychiatric: He has a normal mood and affect  Additional Data:     Lab Results: I have personally reviewed pertinent reports  Results from last 7 days  Lab Units 12/17/17  0326   WBC Thousand/uL 13 77*   HEMOGLOBIN g/dL 15 8   HEMATOCRIT % 46 3   PLATELETS Thousands/uL 237   NEUTROS PCT % 66   LYMPHS PCT % 24   MONOS PCT % 9   EOS PCT % 1       Results from last 7 days  Lab Units 12/17/17  0326   SODIUM mmol/L 135*   POTASSIUM mmol/L 5 2   CHLORIDE mmol/L 100   CO2 mmol/L 24   BUN mg/dL 27*   CREATININE mg/dL 1 68*   CALCIUM mg/dL 10 2*   TOTAL PROTEIN g/dL 8 1   BILIRUBIN TOTAL mg/dL 0 56   ALK PHOS U/L 101   ALT U/L 34   AST U/L 34   GLUCOSE RANDOM mg/dL 140       Results from last 7 days  Lab Units 12/17/17  0326   INR  0 92       Imaging: I have personally reviewed pertinent reports   , I have personally reviewed pertinent films in PACS and No vascular congestion, infiltrate or dissection    No results found  EKG, Pathology, and Other Studies Reviewed on Admission:   · Normal sinus rhythm  · EKG:  T-wave inversions with mild ST depressions in 3 and AVF as well as T-wave inversions in V5 V6    Allscripts / Epic Records Reviewed: Yes     ** Please Note: This note has been constructed using a voice recognition system   **

## 2017-12-17 NOTE — ASSESSMENT & PLAN NOTE
Last nuclear medicine stress test negative 1 year prior at [address] post bypass in 2009  Continue beta-blocker, continue full aspirin as noted above, continue statin

## 2017-12-17 NOTE — PROGRESS NOTES
I performed a history and physical the patient  I reviewed the history and physical of Mitzy Middleton Pac  Please see her detailed separate history and physical    I spoke with the patient's wife at the bedside  I verified this old records including Care everywhere  In brief the patient is a 64-year-old male with known history of coronary artery disease status post CABG in 2009  He also has severe right shoulder pain due to severe rotator cuff tear  He follows up with [facility] (Dr Hosea Vo) for this  For the past 3 days he has experienced worsening right shoulder pain and limited range of motion  His tramadol has been ineffective  Due to severe pain he also took ibuprofen no in that he has chronic kidney disease and he was not supposed to take this  Last night he had severe pain to the point that he was vomiting  It was more in the right shoulder but it started radiating to the left chest   He was concerned about developing a heart attack and went to the ER  On exam he has right shoulder effusion and limited range of motion  Passive movement of the shoulder cause significant pain     Blood pressure was 180/80 manually  · Severe right shoulder pain- secondary to rotator cuff tear  Start Percocet  The patient is scheduled for follow-up with [facility] tomorrow at 9:15 a m  for possible injection  Agree with Lidoderm patch  · Atypical chest pain-secondary to rotator cuff tear  ACS has been ruled out  · Accelerated hypertension-precipitated by his pain  Start Percocet today and watch for side effects  I suspect his blood pressure will improve with pain control  If his blood pressure remains elevated he will need to stay another night for blood pressure control  Continue metoprolol and lisinopril    · CAD-continue aspirin

## 2017-12-17 NOTE — ASSESSMENT & PLAN NOTE
Accelerated secondary to pain  Continue lisinopril 2 5 mg increased to 5 mg daily here, continue metoprolol succinate 50 mg daily

## 2017-12-17 NOTE — ASSESSMENT & PLAN NOTE
The patient was for a TSA through [place] [id number]  Ivana Santiago) w/ f/u appointment tomorrow  Continue analgesic control  F/u with ortho upon d/c

## 2017-12-18 LAB
ATRIAL RATE: 77 BPM
P AXIS: 66 DEGREES
PR INTERVAL: 162 MS
QRS AXIS: 49 DEGREES
QRSD INTERVAL: 100 MS
QT INTERVAL: 372 MS
QTC INTERVAL: 420 MS
T WAVE AXIS: 75 DEGREES
VENTRICULAR RATE: 77 BPM